# Patient Record
Sex: MALE | Race: WHITE | URBAN - METROPOLITAN AREA
[De-identification: names, ages, dates, MRNs, and addresses within clinical notes are randomized per-mention and may not be internally consistent; named-entity substitution may affect disease eponyms.]

---

## 2022-04-28 ENCOUNTER — OFFICE VISIT (OUTPATIENT)
Dept: URBAN - METROPOLITAN AREA CLINIC 45 | Facility: CLINIC | Age: 67
End: 2022-04-28
Payer: MEDICARE

## 2022-04-28 DIAGNOSIS — H40.1123 PRIMARY OPEN-ANGLE GLAUCOMA, LEFT EYE, SEVERE STAGE: Primary | ICD-10-CM

## 2022-04-28 DIAGNOSIS — H40.1112 PRIMARY OPEN-ANGLE GLAUCOMA, RIGHT EYE, MODERATE STAGE: ICD-10-CM

## 2022-04-28 DIAGNOSIS — H25.13 AGE-RELATED NUCLEAR CATARACT, BILATERAL: ICD-10-CM

## 2022-04-28 PROCEDURE — 92020 GONIOSCOPY: CPT | Performed by: OPHTHALMOLOGY

## 2022-04-28 PROCEDURE — 99204 OFFICE O/P NEW MOD 45 MIN: CPT | Performed by: OPHTHALMOLOGY

## 2022-04-28 PROCEDURE — 92083 EXTENDED VISUAL FIELD XM: CPT | Performed by: OPHTHALMOLOGY

## 2022-04-28 PROCEDURE — 92133 CPTRZD OPH DX IMG PST SGM ON: CPT | Performed by: OPHTHALMOLOGY

## 2022-04-28 PROCEDURE — 76514 ECHO EXAM OF EYE THICKNESS: CPT | Performed by: OPHTHALMOLOGY

## 2022-04-28 ASSESSMENT — INTRAOCULAR PRESSURE
OD: 16
OS: 19

## 2022-04-28 NOTE — IMPRESSION/PLAN
Impression: Primary open-angle glaucoma, left eye, severe stage: Z14.3099. /557, 4/22 OCT 34/43 4/4, 4/22 HVF OD IA OS SA/central
GCC -/54 Plan: Discussed diagnosis with patient. HVF and RNFL OCT ordered and reviewed with patient. IOP borderline OS, patient did not instill IOP gtts this morning. Discussed treatment options. Stressed importance of compliance with medication and regular follow up visits. Advised patient to make sure gtts are instilled prior to each appointment. Recommend patient continue Rocklatan QHS OU and Simbrinza BID OU. Will continue to monitor closely. 1 month IOP check and Optos.

## 2022-06-15 ENCOUNTER — OFFICE VISIT (OUTPATIENT)
Dept: URBAN - METROPOLITAN AREA CLINIC 44 | Facility: CLINIC | Age: 67
End: 2022-06-15
Payer: MEDICARE

## 2022-06-15 DIAGNOSIS — H40.033 ANATOMICAL NARROW ANGLE, BILATERAL: Primary | ICD-10-CM

## 2022-06-15 PROCEDURE — 92133 CPTRZD OPH DX IMG PST SGM ON: CPT | Performed by: OPHTHALMOLOGY

## 2022-06-15 PROCEDURE — 92083 EXTENDED VISUAL FIELD XM: CPT | Performed by: OPHTHALMOLOGY

## 2022-06-15 PROCEDURE — 99204 OFFICE O/P NEW MOD 45 MIN: CPT | Performed by: OPHTHALMOLOGY

## 2022-06-15 PROCEDURE — 92020 GONIOSCOPY: CPT | Performed by: OPHTHALMOLOGY

## 2022-06-15 PROCEDURE — 76514 ECHO EXAM OF EYE THICKNESS: CPT | Performed by: OPHTHALMOLOGY

## 2022-06-15 RX ORDER — BRINZOLAMIDE/BRIMONIDINE TARTRATE 10; 2 MG/ML; MG/ML
SUSPENSION/ DROPS OPHTHALMIC
Qty: 3 | Refills: 3 | Status: ACTIVE
Start: 2022-06-15

## 2022-06-15 RX ORDER — NETARSUDIL AND LATANOPROST OPHTHALMIC SOLUTION, 0.02%/0.005% .2; .05 MG/ML; MG/ML
SOLUTION/ DROPS OPHTHALMIC; TOPICAL
Qty: 7.5 | Refills: 3 | Status: ACTIVE
Start: 2022-06-15

## 2022-06-15 ASSESSMENT — INTRAOCULAR PRESSURE
OD: 14
OS: 15

## 2022-06-15 NOTE — IMPRESSION/PLAN
Impression: Anatomical narrow angle, bilateral: H40.033. Plan: PT HAS NAG RISK AND POAG OU     GONIO : POST TM OU (06/15/22)         PACHS:  579//566 (06/15/22) PT WAS UNDER CARE OF DR. MALLOY , REFERRED FOR CONTINUATION OF CARE  
PT DENIES SULFA ALLERGY 
PT DENIES LUNG DZ 
TARGET IOP MID TEENS OR LESS 
RECOMMEND : 
1. CONTINUE ROCKLATAN OU QHS (STARTED BY DR. QUINONES) 2. CONTINUE SIMBRINZA OU BID (STARTED BY DR. QUINONES ) 3. CAN ADD TIMOLOL IN THE FUTURE IF COMES OF Marcie Amato 4. RECOMMEND LPI OU TO PREVENT ANGLE CLOSURE ; THE PT IS AWARE THAT THERE IS A 20% CHANCE OF REQUIRING A 2ND SESSION OF LASER TO CREATE AN IDEAL SIZED LPI. THE PT IS AWARE THAT THEY WILL DEVELOP AN INFERIOR FLOATER FOLLOWING SUCCESSFUL LPI. THE PT IS AWARE THAT LPI WILL NOT IMPROVE THEIR VISION NOR LOWER THEIR  INTRAOCULAR PRESSURE. 5. PT DEFERS 06/15/22 6. [address] [place] 06/15/22 6.  FOLLOW UP IOP CHECK 3 MONTHS

## 2022-07-27 ENCOUNTER — OFFICE VISIT (OUTPATIENT)
Dept: URBAN - METROPOLITAN AREA CLINIC 44 | Facility: CLINIC | Age: 67
End: 2022-07-27
Payer: MEDICARE

## 2022-07-27 DIAGNOSIS — H40.033 ANATOMICAL NARROW ANGLE, BILATERAL: Primary | ICD-10-CM

## 2022-07-27 PROCEDURE — 99213 OFFICE O/P EST LOW 20 MIN: CPT | Performed by: OPHTHALMOLOGY

## 2022-07-27 ASSESSMENT — INTRAOCULAR PRESSURE
OD: 22
OS: 22

## 2022-07-27 NOTE — IMPRESSION/PLAN
Impression: Anatomical narrow angle, bilateral: H40.033. Plan: PT HAS NAG RISK AND POAG OU     GONIO : POST TM OU (06/15/22)         PACHS:  579//566 (06/15/22) PT WAS UNDER CARE OF DR. MALLOY, REFERRED FOR CONTINUATION OF CARE  
PT DENIES SULFA ALLERGY 
PT DENIES LUNG DZ 
TARGET IOP MID TEENS OR LESS 
RECOMMEND : 
1. CONTINUE ROCKLATAN OU QHS (STARTED BY DR. QUINONES) 2. CONTINUE SIMBRINZA OU BID (STARTED BY DR. QUINONES ) 3. RECOMMEND LPI OU TO PREVENT ANGLE CLOSURE ; THE PT IS AWARE THAT THERE IS A 20% CHANCE OF REQUIRING A 2ND SESSION OF LASER TO CREATE AN IDEAL SIZED LPI. THE PT IS AWARE THAT THEY WILL DEVELOP AN INFERIOR FLOATER FOLLOWING SUCCESSFUL LPI. THE PT IS AWARE THAT LPI WILL NOT IMPROVE THEIR VISION NOR LOWER THEIR  INTRAOCULAR PRESSURE. 4. PT IS SCHEDULED 07/29/22 5.  FOLLOW UP IOP CHECK 3 MONTHS

## 2022-07-29 ENCOUNTER — SURGERY (OUTPATIENT)
Dept: URBAN - METROPOLITAN AREA SURGERY 19 | Facility: SURGERY | Age: 67
End: 2022-07-29
Payer: MEDICARE

## 2022-07-29 PROCEDURE — 66761 REVISION OF IRIS: CPT | Performed by: OPHTHALMOLOGY

## 2022-07-29 RX ORDER — PREDNISOLONE ACETATE 10 MG/ML
1 % SUSPENSION/ DROPS OPHTHALMIC
Qty: 15 | Refills: 0 | Status: ACTIVE
Start: 2022-07-29

## 2022-09-07 ENCOUNTER — OFFICE VISIT (OUTPATIENT)
Dept: URBAN - METROPOLITAN AREA CLINIC 44 | Facility: CLINIC | Age: 67
End: 2022-09-07
Payer: MEDICARE

## 2022-09-07 DIAGNOSIS — H40.033 ANATOMICAL NARROW ANGLE, BILATERAL: Primary | ICD-10-CM

## 2022-09-07 PROCEDURE — 99213 OFFICE O/P EST LOW 20 MIN: CPT | Performed by: OPHTHALMOLOGY

## 2022-09-07 PROCEDURE — 92020 GONIOSCOPY: CPT | Performed by: OPHTHALMOLOGY

## 2022-09-07 ASSESSMENT — INTRAOCULAR PRESSURE
OD: 13
OS: 14

## 2022-09-07 NOTE — IMPRESSION/PLAN
Impression: Anatomical narrow angle, bilateral: H40.033. Plan: PT HAS NAG RISK AND POAG OU     GONIO : SS OU(09/07/22)          PACHS:  579//566 (06/15/22) PT WAS UNDER CARE OF DR. MALLOY, REFERRED FOR CONTINUATION OF CARE  
S/P LPI OD 07/29/22 // S/P LPI OS 08/05/22 PT DENIES SULFA ALLERGY 
PT DENIES LUNG DZ 
TARGET IOP MID TEENS OR LESS 
RECOMMEND : 
1. PT IS DOING WELL S/P LPI OU ; LPI PATENT OU 2. CONTINUE ROCKLATAN OU QHS (STARTED BY DR. QUINONES) 3. CONTINUE SIMBRINZA OU BID (STARTED BY DR. QUINONES ) 4.  F/U 3-4 MONTHS

## 2022-12-28 ENCOUNTER — OFFICE VISIT (OUTPATIENT)
Dept: URBAN - METROPOLITAN AREA CLINIC 44 | Facility: CLINIC | Age: 67
End: 2022-12-28
Payer: MEDICARE

## 2022-12-28 DIAGNOSIS — H40.033 ANATOMICAL NARROW ANGLE, BILATERAL: Primary | ICD-10-CM

## 2022-12-28 PROCEDURE — 99213 OFFICE O/P EST LOW 20 MIN: CPT | Performed by: OPHTHALMOLOGY

## 2022-12-28 RX ORDER — BRINZOLAMIDE/BRIMONIDINE TARTRATE 10; 2 MG/ML; MG/ML
SUSPENSION/ DROPS OPHTHALMIC
Qty: 15 | Refills: 3 | Status: ACTIVE
Start: 2022-12-28

## 2022-12-28 RX ORDER — NETARSUDIL AND LATANOPROST OPHTHALMIC SOLUTION, 0.02%/0.005% .2; .05 MG/ML; MG/ML
SOLUTION/ DROPS OPHTHALMIC; TOPICAL
Qty: 7.5 | Refills: 3 | Status: ACTIVE
Start: 2022-12-28

## 2022-12-28 ASSESSMENT — INTRAOCULAR PRESSURE
OS: 14
OD: 12

## 2022-12-28 NOTE — IMPRESSION/PLAN
Impression: Anatomical narrow angle, bilateral: H40.033. Plan: PT HAS NAG RISK AND POAG OU     GONIO : SS OU(09/07/22) PACHS:  579//566 (06/15/22) S/P LPI OD 07/29/22 // S/P LPI OS 08/05/22 PT DENIES SULFA ALLERGY 
PT DENIES LUNG DZ 
TARGET IOP MID TEENS OR LESS 
RECOMMEND : 
1. S/P LPI OU ; LPI PATENT OU 2. CONTINUE ROCKLATAN OU QHS (STARTED BY DR. QUINONES) 3. CONTINUE SIMBRINZA OU BID (STARTED BY DR. QUINONES ) -VISUAL FIELD 06/15/2022, OPTOS 06/15/2022, RNFL 06/15/2022 4.  SCHEDULE FOLLOW UP IOP CHECK IN 6 MONTHS VISUAL FIELD AND OPTOS

## 2023-03-24 ENCOUNTER — OFFICE VISIT (OUTPATIENT)
Dept: URBAN - METROPOLITAN AREA CLINIC 44 | Facility: CLINIC | Age: 68
End: 2023-03-24
Payer: MEDICARE

## 2023-03-24 DIAGNOSIS — H40.033 ANATOMICAL NARROW ANGLE, BILATERAL: Primary | ICD-10-CM

## 2023-03-24 PROCEDURE — 99213 OFFICE O/P EST LOW 20 MIN: CPT | Performed by: OPHTHALMOLOGY

## 2023-03-24 RX ORDER — NETARSUDIL AND LATANOPROST OPHTHALMIC SOLUTION, 0.02%/0.005% .2; .05 MG/ML; MG/ML
SOLUTION/ DROPS OPHTHALMIC; TOPICAL
Qty: 7.5 | Refills: 3 | Status: ACTIVE
Start: 2023-03-24

## 2023-03-24 RX ORDER — BRINZOLAMIDE/BRIMONIDINE TARTRATE 10; 2 MG/ML; MG/ML
SUSPENSION/ DROPS OPHTHALMIC
Qty: 15 | Refills: 3 | Status: ACTIVE
Start: 2023-03-24

## 2023-03-24 ASSESSMENT — INTRAOCULAR PRESSURE
OS: 15
OD: 14

## 2023-03-24 NOTE — IMPRESSION/PLAN
Impression: Anatomical narrow angle, bilateral: H40.033. Plan: PT HAS NAG RISK AND POAG OU     GONIO : SS OU(09/07/22) PACHS:  579//566 (06/15/22) S/P LPI OD 07/29/22 // S/P LPI OS 08/05/22 PT DENIES SULFA ALLERGY 
PT DENIES LUNG DZ 
TARGET IOP MID TEENS OR LESS 
RECOMMEND : 
1. CONTINUE ROCKLATAN OU QHS (STARTED BY DR. QUINONES) 2. CONTINUE SIMBRINZA OU BID (STARTED BY DR. QUINONES ) 3. VISUAL FIELD 06/15/2022, OPTOS 06/15/2022, RNFL 06/15/2022 4.  SCHEDULE FOLLOW UP IOP CHECK IN 6 MONTHS VISUAL FIELD AND OPTOS

## 2023-10-13 ENCOUNTER — OFFICE VISIT (OUTPATIENT)
Dept: URBAN - METROPOLITAN AREA CLINIC 44 | Facility: CLINIC | Age: 68
End: 2023-10-13
Payer: MEDICARE

## 2023-10-13 DIAGNOSIS — H40.033 ANATOMICAL NARROW ANGLE, BILATERAL: Primary | ICD-10-CM

## 2023-10-13 PROCEDURE — 99213 OFFICE O/P EST LOW 20 MIN: CPT | Performed by: OPHTHALMOLOGY

## 2023-10-13 PROCEDURE — 92083 EXTENDED VISUAL FIELD XM: CPT | Performed by: OPHTHALMOLOGY

## 2023-10-13 RX ORDER — BRINZOLAMIDE/BRIMONIDINE TARTRATE 10; 2 MG/ML; MG/ML
SUSPENSION/ DROPS OPHTHALMIC
Qty: 15 | Refills: 3 | Status: ACTIVE
Start: 2023-10-13

## 2023-10-13 RX ORDER — NETARSUDIL AND LATANOPROST OPHTHALMIC SOLUTION, 0.02%/0.005% .2; .05 MG/ML; MG/ML
SOLUTION/ DROPS OPHTHALMIC; TOPICAL
Qty: 7.5 | Refills: 3 | Status: ACTIVE
Start: 2023-10-13

## 2023-10-13 ASSESSMENT — INTRAOCULAR PRESSURE
OS: 14
OD: 12

## 2023-10-25 ENCOUNTER — OFFICE VISIT (OUTPATIENT)
Dept: URBAN - METROPOLITAN AREA CLINIC 44 | Facility: CLINIC | Age: 68
End: 2023-10-25
Payer: MEDICARE

## 2023-10-25 DIAGNOSIS — H25.813 COMBINED FORMS OF AGE-RELATED CATARACT, BILATERAL: Primary | ICD-10-CM

## 2023-10-25 DIAGNOSIS — H52.13 MYOPIA, BILATERAL: ICD-10-CM

## 2023-10-25 DIAGNOSIS — H40.033 ANATOMICAL NARROW ANGLE, BILATERAL: ICD-10-CM

## 2023-10-25 DIAGNOSIS — H04.123 TEAR FILM INSUFFICIENCY OF BILATERAL LACRIMAL GLANDS: ICD-10-CM

## 2023-10-25 PROCEDURE — 92004 COMPRE OPH EXAM NEW PT 1/>: CPT | Performed by: OPTOMETRIST

## 2023-10-25 ASSESSMENT — KERATOMETRY
OD: 44.88
OS: 45.00

## 2023-10-25 ASSESSMENT — VISUAL ACUITY
OS: 20/25
OD: 20/25

## 2023-10-25 ASSESSMENT — INTRAOCULAR PRESSURE
OS: 14
OD: 14

## 2023-11-15 ENCOUNTER — OFFICE VISIT (OUTPATIENT)
Dept: URBAN - METROPOLITAN AREA CLINIC 24 | Facility: CLINIC | Age: 68
End: 2023-11-15
Payer: MEDICARE

## 2023-11-15 DIAGNOSIS — H40.1132 PRIMARY OPEN-ANGLE GLAUCOMA, MODERATE STAGE, BILATERAL: Primary | ICD-10-CM

## 2023-11-15 DIAGNOSIS — H40.1112 PRIMARY OPEN-ANGLE GLAUCOMA, RIGHT EYE, MODERATE STAGE: ICD-10-CM

## 2023-11-15 PROCEDURE — 99214 OFFICE O/P EST MOD 30 MIN: CPT | Performed by: OPHTHALMOLOGY

## 2023-11-15 PROCEDURE — 92020 GONIOSCOPY: CPT | Performed by: OPHTHALMOLOGY

## 2024-01-10 ENCOUNTER — OFFICE VISIT (OUTPATIENT)
Dept: URBAN - METROPOLITAN AREA CLINIC 44 | Facility: CLINIC | Age: 69
End: 2024-01-10
Payer: MEDICARE

## 2024-01-10 PROCEDURE — 99214 OFFICE O/P EST MOD 30 MIN: CPT | Performed by: OPHTHALMOLOGY

## 2024-01-10 PROCEDURE — 92083 EXTENDED VISUAL FIELD XM: CPT | Performed by: OPHTHALMOLOGY

## 2024-01-10 ASSESSMENT — INTRAOCULAR PRESSURE
OS: 14
OD: 10

## 2024-01-16 ENCOUNTER — OFFICE VISIT (OUTPATIENT)
Dept: URBAN - METROPOLITAN AREA CLINIC 44 | Facility: CLINIC | Age: 69
End: 2024-01-16
Payer: MEDICARE

## 2024-01-16 DIAGNOSIS — H04.123 TEAR FILM INSUFFICIENCY OF BILATERAL LACRIMAL GLANDS: ICD-10-CM

## 2024-01-16 DIAGNOSIS — H25.813 COMBINED FORMS OF AGE-RELATED CATARACT, BILATERAL: ICD-10-CM

## 2024-01-16 DIAGNOSIS — H40.033 ANATOMICAL NARROW ANGLE, BILATERAL: Primary | ICD-10-CM

## 2024-01-16 PROCEDURE — 99214 OFFICE O/P EST MOD 30 MIN: CPT | Performed by: OPTOMETRIST

## 2024-01-16 ASSESSMENT — VISUAL ACUITY
OD: 20/20
OS: 20/25

## 2024-01-16 ASSESSMENT — INTRAOCULAR PRESSURE
OD: 14
OS: 14

## 2024-01-16 ASSESSMENT — KERATOMETRY
OD: 45.00
OS: 44.75

## 2024-02-19 ENCOUNTER — TECH ONLY (OUTPATIENT)
Dept: URBAN - METROPOLITAN AREA CLINIC 44 | Facility: CLINIC | Age: 69
End: 2024-02-19
Payer: MEDICARE

## 2024-02-19 DIAGNOSIS — H25.813 COMBINED FORMS OF AGE-RELATED CATARACT, BILATERAL: Primary | ICD-10-CM

## 2024-02-19 DIAGNOSIS — H25.13 AGE-RELATED NUCLEAR CATARACT, BILATERAL: ICD-10-CM

## 2024-02-19 DIAGNOSIS — Z01.818 ENCOUNTER FOR OTHER PREPROCEDURAL EXAMINATION: Primary | ICD-10-CM

## 2024-02-19 PROCEDURE — 99203 OFFICE O/P NEW LOW 30 MIN: CPT | Performed by: PHYSICIAN ASSISTANT

## 2024-02-19 RX ORDER — HYDROCHLOROTHIAZIDE 25 MG/1
25 MG TABLET ORAL
Qty: 0 | Refills: 0 | Status: ACTIVE
Start: 2024-02-19

## 2024-02-19 RX ORDER — ALPRAZOLAM 1 MG/1
1 MG TABLET ORAL
Qty: 0 | Refills: 0 | Status: ACTIVE
Start: 2024-02-19

## 2024-02-19 ASSESSMENT — PACHYMETRY
OS: 3.21
OS: 23.83
OD: 3.22
OD: 23.92

## 2024-03-11 ENCOUNTER — OFFICE VISIT (OUTPATIENT)
Dept: URBAN - METROPOLITAN AREA CLINIC 44 | Facility: CLINIC | Age: 69
End: 2024-03-11
Payer: MEDICARE

## 2024-03-11 DIAGNOSIS — Z91.148 PATIENT'S OTHER NONCOMPL WITH MEDS REGIMEN FOR OTHER REASON: ICD-10-CM

## 2024-03-11 DIAGNOSIS — H25.813 COMBINED FORMS OF AGE-RELATED CATARACT, BILATERAL: ICD-10-CM

## 2024-03-11 DIAGNOSIS — H40.1132 PRIMARY OPEN-ANGLE GLAUCOMA, BILATERAL, MODERATE STAGE: Primary | ICD-10-CM

## 2024-03-11 PROCEDURE — 99214 OFFICE O/P EST MOD 30 MIN: CPT | Performed by: OPHTHALMOLOGY

## 2024-03-11 PROCEDURE — 92020 GONIOSCOPY: CPT | Performed by: OPHTHALMOLOGY

## 2024-03-11 ASSESSMENT — INTRAOCULAR PRESSURE
OD: 13
OS: 13